# Patient Record
Sex: FEMALE | NOT HISPANIC OR LATINO | URBAN - METROPOLITAN AREA
[De-identification: names, ages, dates, MRNs, and addresses within clinical notes are randomized per-mention and may not be internally consistent; named-entity substitution may affect disease eponyms.]

---

## 2024-05-10 ENCOUNTER — OFFICE (OUTPATIENT)
Dept: URBAN - METROPOLITAN AREA CLINIC 11 | Facility: CLINIC | Age: 34
End: 2024-05-10

## 2024-05-10 VITALS
HEART RATE: 72 BPM | WEIGHT: 233 LBS | HEIGHT: 64 IN | SYSTOLIC BLOOD PRESSURE: 131 MMHG | DIASTOLIC BLOOD PRESSURE: 91 MMHG | OXYGEN SATURATION: 100 %

## 2024-05-10 DIAGNOSIS — Z98.890 OTHER SPECIFIED POSTPROCEDURAL STATES: ICD-10-CM

## 2024-05-10 DIAGNOSIS — R19.7 DIARRHEA, UNSPECIFIED: ICD-10-CM

## 2024-05-10 DIAGNOSIS — D50.9 IRON DEFICIENCY ANEMIA, UNSPECIFIED: ICD-10-CM

## 2024-05-10 DIAGNOSIS — K92.1 MELENA: ICD-10-CM

## 2024-05-10 PROCEDURE — 99204 OFFICE O/P NEW MOD 45 MIN: CPT | Performed by: STUDENT IN AN ORGANIZED HEALTH CARE EDUCATION/TRAINING PROGRAM

## 2024-05-10 RX ORDER — SODIUM PICOSULFATE, MAGNESIUM OXIDE, AND ANHYDROUS CITRIC ACID 12; 3.5; 1 G/175ML; G/175ML; MG/175ML
LIQUID ORAL
Qty: 1 | Refills: 0 | Status: ACTIVE
Start: 2024-05-10

## 2024-05-10 NOTE — SERVICENOTES
patient has unexplained iron deficiency anemia so we need to proceed with EGD and colonoscopy.  Other indications include change in bowel habits, clinical diarrhea, hematochezia.  Will also check blood test today to rule out food allergies upon the patient's request.  Her MCV is low and she is seeing Hematology for this.  She can continue iron supplements as taking.  Will have her return to the clinic after her procedures depending on the findings.  I discussed with patient the risks and benefits of the procedure and she agrees to proceed.  All questions addressed.  I personally reviewed her outside medical records and labs for her appointment today.

## 2024-05-10 NOTE — SERVICEHPINOTES
Ms. Trent Bradley is a 33-year-old female with past medical history of gastric sleeve surgery, ADHD, iron deficiency anemia, presents to gastro 1 is referral for her iron deficiency anemia.
rosenda
br clinic visit 05/10/2024: 
br patient reports that she has had longstanding iron deficiency anemia and is starting see several specialists because of some lab abnormalities including a positive ROBERTO.  She sees [facility] Dr. Marinelli for her anemia and recently had labs done this week.  Her hemoglobin was 11.6 with an MCV of 68, platelets normal, LFTs normal, ferritin was low at 17.  No family history of GI malignancies or liver disease.  Her acute hepatitis panel was normal, TSH was normal.  She is on iron supplements and has some occasional dark stools.  She has noticed some bright red blood in her stool about once every 3 months.  She has a history of gastric sleeve surgery in 2018 in [place] and she was told that she also had a hiatal hernia repair.  She has the sensation of sometimes having go have a bowel movement immediately after eating and have some abdominal pain as well.  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  She had an EGD in 2017 prior to her gastric sleeve surgery and does not report any abnormalities.  She has never had a colonoscopy.  she has diarrhea symptoms.